# Patient Record
Sex: FEMALE | Race: BLACK OR AFRICAN AMERICAN | NOT HISPANIC OR LATINO | Employment: UNEMPLOYED | URBAN - METROPOLITAN AREA
[De-identification: names, ages, dates, MRNs, and addresses within clinical notes are randomized per-mention and may not be internally consistent; named-entity substitution may affect disease eponyms.]

---

## 2022-06-28 ENCOUNTER — OFFICE VISIT (OUTPATIENT)
Dept: PODIATRY | Facility: CLINIC | Age: 37
End: 2022-06-28
Payer: MEDICAID

## 2022-06-28 ENCOUNTER — HOSPITAL ENCOUNTER (OUTPATIENT)
Dept: RADIOLOGY | Facility: HOSPITAL | Age: 37
Discharge: HOME | End: 2022-06-28
Attending: PODIATRIST
Payer: MEDICAID

## 2022-06-28 DIAGNOSIS — M24.572 CONTRACTURE, LEFT ANKLE: ICD-10-CM

## 2022-06-28 DIAGNOSIS — M77.32 HEEL SPUR, LEFT: ICD-10-CM

## 2022-06-28 DIAGNOSIS — M77.52 BURSITIS OF POSTERIOR HEEL, LEFT: ICD-10-CM

## 2022-06-28 DIAGNOSIS — M77.52 BURSITIS OF POSTERIOR HEEL, LEFT: Primary | ICD-10-CM

## 2022-06-28 DIAGNOSIS — M25.572 PAIN IN LEFT ANKLE AND JOINTS OF LEFT FOOT: ICD-10-CM

## 2022-06-28 PROCEDURE — 99999 PR PBB SHADOW E&M-EST. PATIENT-LVL II: CPT | Mod: PBBFAC,,, | Performed by: PODIATRIST

## 2022-06-28 PROCEDURE — 99204 PR OFFICE/OUTPT VISIT, NEW, LEVL IV, 45-59 MIN: ICD-10-PCS | Mod: S$PBB,,, | Performed by: PODIATRIST

## 2022-06-28 PROCEDURE — 1160F RVW MEDS BY RX/DR IN RCRD: CPT | Mod: CPTII,,, | Performed by: PODIATRIST

## 2022-06-28 PROCEDURE — 73650 X-RAY EXAM OF HEEL: CPT | Mod: 26,LT,, | Performed by: RADIOLOGY

## 2022-06-28 PROCEDURE — 99999 PR PBB SHADOW E&M-EST. PATIENT-LVL II: ICD-10-PCS | Mod: PBBFAC,,, | Performed by: PODIATRIST

## 2022-06-28 PROCEDURE — 73650 X-RAY EXAM OF HEEL: CPT | Mod: TC,LT

## 2022-06-28 PROCEDURE — 1160F PR REVIEW ALL MEDS BY PRESCRIBER/CLIN PHARMACIST DOCUMENTED: ICD-10-PCS | Mod: CPTII,,, | Performed by: PODIATRIST

## 2022-06-28 PROCEDURE — 99204 OFFICE O/P NEW MOD 45 MIN: CPT | Mod: S$PBB,,, | Performed by: PODIATRIST

## 2022-06-28 PROCEDURE — 73650 XR CALCANEUS 2 VIEW LEFT: ICD-10-PCS | Mod: 26,LT,, | Performed by: RADIOLOGY

## 2022-06-28 PROCEDURE — 1159F MED LIST DOCD IN RCRD: CPT | Mod: CPTII,,, | Performed by: PODIATRIST

## 2022-06-28 PROCEDURE — 1159F PR MEDICATION LIST DOCUMENTED IN MEDICAL RECORD: ICD-10-PCS | Mod: CPTII,,, | Performed by: PODIATRIST

## 2022-06-28 PROCEDURE — 99212 OFFICE O/P EST SF 10 MIN: CPT | Mod: PBBFAC | Performed by: PODIATRIST

## 2022-06-28 RX ORDER — IBUPROFEN 800 MG/1
800 TABLET ORAL 2 TIMES DAILY
Qty: 60 TABLET | Refills: 1 | Status: SHIPPED | OUTPATIENT
Start: 2022-06-28 | End: 2022-07-28

## 2022-06-28 NOTE — PROGRESS NOTES
Subjective:       Patient ID: Lily Brock is a 36 y.o. female.    Chief Complaint: Heel Pain (Left heel pain when pressure is applied to it, 7/10 pain at present, non-diabetic, no pcp)      HPI: Lily Brock complains of moderate to severe pains to the left posterior heel/foot. States pains are sharp and stabbing-like in nature. Pains are to the posterior aspect of the ankle joint, mostly with walking and standing. Rates the pains at approx. 7/10. States post-static dyskinesia to this area and the plantar heel as well. Denies any recent identifiable trauma. Does limp with gait. States seldom NSAID medications thus far for treatment. Pains have been present for the past several weeks to months and the pains have worsened over the past couple of weeks. States walking and standing causes and/or exacerbates the symptoms.    Review of patient's allergies indicates:  No Known Allergies    History reviewed. No pertinent past medical history.    History reviewed. No pertinent family history.    Social History     Socioeconomic History    Marital status: Single       History reviewed. No pertinent surgical history.    Review of Systems   Constitutional: Negative for chills, fatigue and fever.   HENT: Negative for hearing loss.    Eyes: Negative for photophobia and visual disturbance.   Respiratory: Negative for cough, chest tightness, shortness of breath and wheezing.    Cardiovascular: Negative for chest pain and palpitations.   Gastrointestinal: Negative for constipation, diarrhea, nausea and vomiting.   Endocrine: Negative for cold intolerance and heat intolerance.   Genitourinary: Negative for flank pain.   Musculoskeletal: Positive for gait problem. Negative for neck pain and neck stiffness.   Neurological: Negative for light-headedness and headaches.   Psychiatric/Behavioral: Negative for sleep disturbance.          Objective:   There were no vitals taken for this visit.    No image results found.        Physical Exam  LOWER EXTREMITY PHYSICAL EXAMINATION    ORTHOPEDIC: There is a small palpable retro-calcaneal spur and/or a Boston's deformity noted on the left foot. There is moderate tenderness to palpation of the achilles tendon insertion on to the posterior superior calcaneus. Upon medial to lateral compression of the heel bone at the distal most insertion of the achilles tendon, there is moderate discomfort. There is no tenderness to palpation of the plantar medial calcaneal tubercle at the origin of the plantar fascia. Upon palpation of the achilles tendon, there are no defects and/or fusiform swelling noted. There is no tenderness to palpation, save for at approx. 1cm-2cm proximal to the achilles insertion on the calcaneus. MMT in the sagittal plane with dorsiflexion is 5/5 and with plantarflexion, it is too 5/5, but with pain and gaurding. Plantarflexion ability on this limb is decreased as compared to contra-lateral. Ankle ROM is not painful and/or creptiant. Equinus is noted. Gait pattern is antalgic at present.       Assessment:     1. Bursitis of posterior heel, left    2. Heel spur, left    3. Contracture, left ankle    4. Pain in left ankle and joints of left foot          Plan:     Bursitis of posterior heel, left  -     X-Ray Calcaneus 2 View Left; Future; Expected date: 06/28/2022  -     Ambulatory referral/consult to Physical/Occupational Therapy; Future; Expected date: 06/29/2022  -     ibuprofen (ADVIL,MOTRIN) 800 MG tablet; Take 1 tablet (800 mg total) by mouth 2 (two) times daily.  Dispense: 60 tablet; Refill: 1    Heel spur, left  -     X-Ray Calcaneus 2 View Left; Future; Expected date: 06/28/2022  -     Ambulatory referral/consult to Physical/Occupational Therapy; Future; Expected date: 06/29/2022  -     ibuprofen (ADVIL,MOTRIN) 800 MG tablet; Take 1 tablet (800 mg total) by mouth 2 (two) times daily.  Dispense: 60 tablet; Refill: 1    Contracture, left ankle  -     Ambulatory  referral/consult to Physical/Occupational Therapy; Future; Expected date: 06/29/2022    Pain in left ankle and joints of left foot  -     ibuprofen (ADVIL,MOTRIN) 800 MG tablet; Take 1 tablet (800 mg total) by mouth 2 (two) times daily.  Dispense: 60 tablet; Refill: 1      Thorough discussion is had with the patient today, concerning the diagnosis, its etiology, and the treatment algorithm at present.     Start CAM Walker.    CAM Walker (Walking Boot), short/tall is dispensed to the patient. The CAM Walker is appropriately fitted and customized to the patient's lower extremity physique by the LPN/MA. Patient to ambulate with the CAM Walker at all times. The patient should not sleep with the device or shower with the device, or drive with the device (if dispensed for right ankle/foot pathology).     Patient should ambulate and would feel best in shoe gear with slight heel elevation as it does unload/de-tension the Achilles tendon, thus lessening its pull at the posterior heel bone, resulting in pain relief.    Start NSAIDs for duration of 10-14 days, then prn.    Start PT as out-patient.           Future Appointments   Date Time Provider Department Center   6/28/2022 12:15 PM MAYANK MIX-DR [facility] [place]

## 2024-06-24 DIAGNOSIS — S89.92XA INJURY OF LEFT KNEE, INITIAL ENCOUNTER: Primary | ICD-10-CM

## 2024-06-25 ENCOUNTER — OFFICE VISIT (OUTPATIENT)
Dept: SPORTS MEDICINE | Facility: CLINIC | Age: 39
End: 2024-06-25
Payer: COMMERCIAL

## 2024-06-25 ENCOUNTER — HOSPITAL ENCOUNTER (OUTPATIENT)
Dept: RADIOLOGY | Facility: HOSPITAL | Age: 39
Discharge: HOME | End: 2024-06-25
Attending: STUDENT IN AN ORGANIZED HEALTH CARE EDUCATION/TRAINING PROGRAM
Payer: COMMERCIAL

## 2024-06-25 DIAGNOSIS — M25.462 EFFUSION, LEFT KNEE: ICD-10-CM

## 2024-06-25 DIAGNOSIS — S89.92XA INJURY OF LEFT KNEE, INITIAL ENCOUNTER: ICD-10-CM

## 2024-06-25 DIAGNOSIS — Z98.890 S/P ACL RECONSTRUCTION: Primary | ICD-10-CM

## 2024-06-25 PROCEDURE — 73564 X-RAY EXAM KNEE 4 OR MORE: CPT | Mod: 26,LT,, | Performed by: RADIOLOGY

## 2024-06-25 PROCEDURE — 73564 X-RAY EXAM KNEE 4 OR MORE: CPT | Mod: TC,PN,LT

## 2024-06-25 PROCEDURE — 97110 THERAPEUTIC EXERCISES: CPT | Mod: S$GLB,,, | Performed by: STUDENT IN AN ORGANIZED HEALTH CARE EDUCATION/TRAINING PROGRAM

## 2024-06-25 PROCEDURE — 99999 PR PBB SHADOW E&M-EST. PATIENT-LVL II: CPT | Mod: PBBFAC,,, | Performed by: STUDENT IN AN ORGANIZED HEALTH CARE EDUCATION/TRAINING PROGRAM

## 2024-06-25 PROCEDURE — 99204 OFFICE O/P NEW MOD 45 MIN: CPT | Mod: 25,S$GLB,, | Performed by: STUDENT IN AN ORGANIZED HEALTH CARE EDUCATION/TRAINING PROGRAM

## 2024-06-25 PROCEDURE — 73562 X-RAY EXAM OF KNEE 3: CPT | Mod: 26,59,RT, | Performed by: RADIOLOGY

## 2024-06-25 NOTE — PATIENT INSTRUCTIONS
Assessment:  Lily Brock is a 38 y.o. female No chief complaint on file.      Encounter Diagnoses   Name Primary?    Effusion, left knee Yes    S/P ACL reconstruction         Plan:  MRI left knee  At least 10 minutes were spent teaching the patient a therapeutic home exercise program and they were provided this plan in writing.  CPT 34443           Follow-up: After MRI left knee or sooner if there are any problems between now and then.    Thank you for choosing [facility] and Dr. Nathan Zepeda for your orthopedic & sports medicine care. It is our goal to provide you with exceptional care that will help keep you healthy, active, and get you back in the game.    Please do not hesitate to reach out to us via email, phone, or MyChart with any questions, concerns, or feedback.    If you felt that you received exemplary care today, please consider leaving us feedback on [employer] at:  [email or web address]    If you are experiencing pain/discomfort ,or have questions and would like to be connected to the [facility] on-call team, please call this number and specify which Sports Medicine provider is treating you: [phone]

## 2024-06-25 NOTE — PROGRESS NOTES
"        Patient ID: Lily Brock  YOB: 1985  MRN: [id number]    Chief Complaint: No chief complaint on file.    Referred By: Self for left knee    History of Present Illness: Lily Brock is a 38 y.o. female who presents today with left knee pain and swelling.  She is status post ACLr 20 years ago in  following non contact sport injury as well as mensicus repair 20 years ago. Had no issues with knee prior to last Wednesday when she had a pop up tenet fall onto her knees. Woke up next day with swelling and pain. Painful ambulation. Endorses locking, mechancial block. Has not done treatment or seen other provider for this since injury. No formal therapy since post op 20 years ago.     No results found for: "HGBA1C"    The patient is active in  resistance training .  Occupation: teacher [employer]    Past Medical History:   No past medical history on file.  No past surgical history on file.  No family history on file.  Social History     Socioeconomic History    Marital status: Single   Social History Narrative    ** Merged History Encounter **            Review of patient's allergies indicates:  No Known Allergies    Physical Exam:   There is no height or weight on file to calculate BMI.    GENERAL: Well appearing, in no acute distress.  HEAD: Normocephalic and atraumatic.  ENT: External ears and nose grossly normal.  EYES: EOMI bilaterally  PULMONARY: Respirations are grossly even and non-labored.  NEURO: Awake, alert, and oriented x 3.  SKIN: No obvious rashes appreciated.  PSYCH: Mood & affect are appropriate.    Detailed MSK exam:     No obvious deformities, no ecchymosis, no erythema.  Post surgical vertical scar seen over anterior knee. Large effusion. Limited ROM,  active flexion to 90 and passive flexion to 95 and active extension to 5 and passive extension to 0 vs -10 compared to  contralateral side. Tender to palpation at lateral joint line . Patellar Apprehension negative.  Limited " patellar mobility. Ligamentous exam: Lachman positive. Valgus @ 0 negative. Valgus @ 30 negative. Varus negative. Anterior drawer positive. Posterior Drawer negative.     Imaging:    X-ray Knee Ortho Left with Flexion  Narrative: EXAM:  XR KNEE ORTHO LEFT WITH FLEXION    CLINICAL HISTORY: Left knee pain.    FINDINGS: AP Standing, Flexion and sunrise view bilaterally and lateral view of the left knee.  No comparison.     No fracture is identified.  Joint alignment is anatomic.       Mild tricompartment degenerative changes.  Small joint effusion.  Impression:   Small joint effusion.    Finalized on: 6/25/2024 9:31 AM By:  Phong Liz MD  [facility]# [id number]      2024-06-25 09:33:[id number]    [facility]     Relevant imaging results were reviewed and interpreted by me and per my read as above.  This was discussed with the patient and / or family today.     Assessment:  Lily Brock is a 38 y.o. female presents today for left knee pain and swelling after trauma 1 week ago.  Has a significant effusion today lack of some extension flexion and concerning provocative signs for possible meniscus injury.  Discussed due to her effusion mechanical symptoms and exam today I would like to move forward with an urgent MRI.  We will defer any bracing as she has good stability otherwise with slight laxity from a previous ACL reconstruction.  Follow-up after MRI left knee for further clinical management.  Discussed little to no lower body exercise and activity until then.  Defer PT discussed home exercise program for quad activation swelling control and range of motion.    S/P ACL reconstruction  -     MRI Knee Without Contrast Left; Future; Expected date: 06/25/2024    Effusion, left knee  -     MRI Knee Without Contrast Left; Future; Expected date: 06/25/2024         A copy of today's visit note has been sent to the referring provider.       Nathan Zepeda MD    Disclaimer: This note was prepared using a voice recognition system and is likely  to have sound alike errors within the text.

## 2024-07-05 ENCOUNTER — TELEPHONE (OUTPATIENT)
Dept: SPORTS MEDICINE | Facility: CLINIC | Age: 39
End: 2024-07-05
Payer: COMMERCIAL

## 2024-07-08 ENCOUNTER — TELEPHONE (OUTPATIENT)
Dept: SPORTS MEDICINE | Facility: CLINIC | Age: 39
End: 2024-07-08
Payer: COMMERCIAL